# Patient Record
Sex: MALE | Race: BLACK OR AFRICAN AMERICAN | Employment: FULL TIME | URBAN - METROPOLITAN AREA
[De-identification: names, ages, dates, MRNs, and addresses within clinical notes are randomized per-mention and may not be internally consistent; named-entity substitution may affect disease eponyms.]

---

## 2019-05-11 ENCOUNTER — HOSPITAL ENCOUNTER (EMERGENCY)
Facility: CLINIC | Age: 34
Discharge: HOME | End: 2019-05-12
Attending: EMERGENCY MEDICINE | Admitting: EMERGENCY MEDICINE
Payer: COMMERCIAL

## 2019-05-11 ENCOUNTER — APPOINTMENT (OUTPATIENT)
Dept: GENERAL RADIOLOGY | Facility: CLINIC | Age: 34
End: 2019-05-11
Attending: EMERGENCY MEDICINE
Payer: COMMERCIAL

## 2019-05-11 DIAGNOSIS — M23.92 INTERNAL DERANGEMENT OF KNEE, LEFT: ICD-10-CM

## 2019-05-11 PROCEDURE — 29505 APPLICATION LONG LEG SPLINT: CPT

## 2019-05-11 PROCEDURE — 99284 EMERGENCY DEPT VISIT MOD MDM: CPT | Mod: 25

## 2019-05-11 PROCEDURE — 73562 X-RAY EXAM OF KNEE 3: CPT | Mod: LT

## 2019-05-11 PROCEDURE — 25000132 ZZH RX MED GY IP 250 OP 250 PS 637: Performed by: EMERGENCY MEDICINE

## 2019-05-11 RX ORDER — IBUPROFEN 600 MG/1
600 TABLET, FILM COATED ORAL ONCE
Status: COMPLETED | OUTPATIENT
Start: 2019-05-11 | End: 2019-05-11

## 2019-05-11 RX ADMIN — IBUPROFEN 600 MG: 600 TABLET ORAL at 23:24

## 2019-05-11 ASSESSMENT — ENCOUNTER SYMPTOMS: JOINT SWELLING: 1

## 2019-05-11 NOTE — ED AVS SNAPSHOT
Emergency Department  [address] [place] [address]  Phone:  [phone]  Fax:  [phone]                                    Ed Grace   MRN: [id number]    Department:   Emergency Department   Date of Visit:  5/11/2019           After Visit Summary Signature Page    I have received my discharge instructions, and my questions have been answered. I have discussed any challenges I see with this plan with the nurse or doctor.    ..........................................................................................................................................  Patient/Patient Representative Signature      ..........................................................................................................................................  Patient Representative Print Name and Relationship to Patient    ..................................................               ................................................  Date                                   Time    ..........................................................................................................................................  Reviewed by Signature/Title    ...................................................              ..............................................  Date                                               Time          22EPIC Rev 08/18

## 2019-05-11 NOTE — LETTER
May 11, 2019      To Whom It May Concern:      Ed Grace was seen in our Emergency Department today, 05/11/19.  I expect his condition to improve over the next 1 days.  He may return to work when improved.    Sincerely,        Svetlana CASEY RN

## 2019-05-12 VITALS
RESPIRATION RATE: 16 BRPM | HEART RATE: 60 BPM | TEMPERATURE: 98.4 F | OXYGEN SATURATION: 100 % | SYSTOLIC BLOOD PRESSURE: 126 MMHG | DIASTOLIC BLOOD PRESSURE: 77 MMHG

## 2019-05-12 NOTE — ED PROVIDER NOTES
History     Chief Complaint  Knee pain    HPI   Ed Grace is a 33 year old male who presents with a left knee injury. The patient states that he was playing basketball around 1000 today and he jumped to block a shot and landed on another players foot. His legs were then pulled apart and his left leg twisted. He has had left knee pain and increasing swelling since the injury. The patient has not been able to bear weight on the leg.     Allergies:  No known allergies     Medications:    The patient is currently on no regular medications.     Past Medical History:    The patient denies any significant past medical history.    Past Surgical History:    The patient does not have any pertinent past surgical history.    Family History:    No past pertinent family history.    Social History:  Patient presents with significant other  Negative for tobacco use.  Marital Status:  Single      Review of Systems   Musculoskeletal: Positive for gait problem and joint swelling.        Left knee pain   All other systems reviewed and are negative.      Physical Exam   First Vitals:  BP: 141/82  Pulse: 63  Temp: 98.4  F (36.9  C)  Resp: 16  SpO2: 100 %      Physical Exam  Vitals: reviewed by me  General: Pt seen on [facility], cooperative, and alert to conversation  Eyes: Tracking well, clear conjunctiva BL  Resp:  No tachypnea, no accessory muscle use.   MSK: no obvious peripheral edema or joint effusion.    Left lower extremity with sensation intact light touch and 5 out of 5 motor throughout.  Knee extension mechanism intact.  Minimal tenderness to palpation of the medial aspect of the joint, does have some swelling and redness and erythema in this area as well.  Able to bear weight without pain.  No skin breaks.  Skin: No rash, normal turgor and temperature  Neuro: Clear speech and no facial droop.      Emergency Department Course   Imaging:  Radiographic findings were communicated with the patient who voiced  understanding of the findings.  X-ray left knee 3 views:  Normal     Interventions:  2324 - Advil 600 mg PO    Emergency Department Course:  Nursing notes and vitals reviewed.  2255: I performed an exam of the patient as documented above.       Impression & Plan      Medical Decision Making:  Ed Grace is a very pleasant 33 year old male Who presents to the emergency room with swelling over his medial left knee. His x-ray is negative. He sustained a non contact injury and this is likely a MCL tear or possibly a meniscal injury. With a normal x-ray, and his ability to bear weight, I do think it is prudent to keep him in a knee immobilizer and follow with [facility] for an MRI as he may indeed need surgery for a MCL injury. Patient is ok with this plan, all ligaments appear to be intact otherwise and he is bearing weight as well. Ok for discharge as above.     Diagnosis:    ICD-10-CM    1. Internal derangement of knee, left M23.92        Disposition:  discharged to home    Discharge Medications:     Medication List      There are no discharge medications for this visit.       I, Sloan Mcrae, am serving as a scribe on 5/11/2019 at 10:59 PM to personally document services performed by Josh Sams MD based on my observations and the provider's statements to me.       Sloan Mcrae  5/11/2019    EMERGENCY DEPARTMENT       Josh Sams MD  05/11/19 [id number]